# Patient Record
Sex: FEMALE | Race: WHITE | Employment: FULL TIME | URBAN - METROPOLITAN AREA
[De-identification: names, ages, dates, MRNs, and addresses within clinical notes are randomized per-mention and may not be internally consistent; named-entity substitution may affect disease eponyms.]

---

## 2018-11-12 ENCOUNTER — HOSPITAL ENCOUNTER (OUTPATIENT)
Dept: MAMMOGRAPHY | Age: 43
Discharge: HOME | End: 2018-11-12
Attending: FAMILY MEDICINE
Payer: COMMERCIAL

## 2018-11-12 DIAGNOSIS — Z12.31 VISIT FOR SCREENING MAMMOGRAM: ICD-10-CM

## 2018-11-12 PROCEDURE — 77067 SCR MAMMO BI INCL CAD: CPT

## 2020-01-10 ENCOUNTER — HOSPITAL ENCOUNTER (OUTPATIENT)
Dept: MAMMOGRAPHY | Age: 45
Discharge: HOME | End: 2020-01-10
Attending: FAMILY MEDICINE
Payer: COMMERCIAL

## 2020-01-10 DIAGNOSIS — Z12.31 VISIT FOR SCREENING MAMMOGRAM: ICD-10-CM

## 2020-01-10 PROCEDURE — 77067 SCR MAMMO BI INCL CAD: CPT

## 2021-01-25 ENCOUNTER — TRANSCRIBE ORDER (OUTPATIENT)
Dept: SCHEDULING | Age: 46
End: 2021-01-25

## 2021-01-25 DIAGNOSIS — Z12.31 ENCOUNTER FOR SCREENING MAMMOGRAM FOR MALIGNANT NEOPLASM OF BREAST: Primary | ICD-10-CM

## 2021-01-28 ENCOUNTER — HOSPITAL ENCOUNTER (OUTPATIENT)
Dept: MAMMOGRAPHY | Age: 46
Discharge: HOME | End: 2021-01-28
Attending: FAMILY MEDICINE
Payer: COMMERCIAL

## 2021-01-28 DIAGNOSIS — Z12.31 ENCOUNTER FOR SCREENING MAMMOGRAM FOR MALIGNANT NEOPLASM OF BREAST: ICD-10-CM

## 2021-01-28 PROCEDURE — 77063 BREAST TOMOSYNTHESIS BI: CPT

## 2022-01-17 ENCOUNTER — TRANSCRIBE ORDER (OUTPATIENT)
Dept: SCHEDULING | Age: 47
End: 2022-01-17

## 2022-01-17 DIAGNOSIS — Z12.31 ENCOUNTER FOR MAMMOGRAM TO ESTABLISH BASELINE MAMMOGRAM: Primary | ICD-10-CM

## 2022-02-02 ENCOUNTER — TRANSCRIBE ORDER (OUTPATIENT)
Dept: GENERAL RADIOLOGY | Age: 47
End: 2022-02-02

## 2022-02-02 ENCOUNTER — HOSPITAL ENCOUNTER (OUTPATIENT)
Dept: MAMMOGRAPHY | Age: 47
Discharge: HOME | End: 2022-02-02
Attending: FAMILY MEDICINE
Payer: COMMERCIAL

## 2022-02-02 DIAGNOSIS — Z12.31 VISIT FOR SCREENING MAMMOGRAM: Primary | ICD-10-CM

## 2022-02-02 DIAGNOSIS — R92.8 ABNORMAL MAMMOGRAM OF LEFT BREAST: Primary | ICD-10-CM

## 2022-02-02 DIAGNOSIS — Z12.31 VISIT FOR SCREENING MAMMOGRAM: ICD-10-CM

## 2022-02-02 PROCEDURE — 77063 BREAST TOMOSYNTHESIS BI: CPT

## 2022-02-11 ENCOUNTER — HOSPITAL ENCOUNTER (OUTPATIENT)
Dept: MAMMOGRAPHY | Age: 47
Discharge: HOME | End: 2022-02-11
Attending: FAMILY MEDICINE
Payer: COMMERCIAL

## 2022-02-11 DIAGNOSIS — R92.8 ABNORMAL MAMMOGRAM OF LEFT BREAST: ICD-10-CM

## 2022-02-11 PROCEDURE — 76642 ULTRASOUND BREAST LIMITED: CPT

## 2022-02-11 PROCEDURE — 77065 DX MAMMO INCL CAD UNI: CPT

## 2022-02-11 NOTE — PROGRESS NOTES
Called Dr. Carlos Eduardo Pritchard office and spoke with Cami Son. Advised her that a recommendation has been made for a left breast cyst aspiration or possible biopsy by the radiologist. Report and order request faxed to [phone]. [facility] that the patient has an appointment 2/15 at 0730.

## 2022-02-15 ENCOUNTER — HOSPITAL ENCOUNTER (OUTPATIENT)
Dept: MAMMOGRAPHY | Age: 47
Discharge: HOME | End: 2022-02-15
Attending: FAMILY MEDICINE

## 2022-02-15 DIAGNOSIS — R92.8 ABNORMAL MAMMOGRAM OF LEFT BREAST: ICD-10-CM

## 2023-01-24 ENCOUNTER — TRANSCRIBE ORDER (OUTPATIENT)
Dept: SCHEDULING | Age: 48
End: 2023-01-24

## 2023-01-24 DIAGNOSIS — Z12.31 SCREENING MAMMOGRAM FOR HIGH-RISK PATIENT: Primary | ICD-10-CM

## 2023-04-03 ENCOUNTER — HOSPITAL ENCOUNTER (OUTPATIENT)
Dept: MAMMOGRAPHY | Age: 48
End: 2023-04-03
Attending: FAMILY MEDICINE
Payer: COMMERCIAL

## 2023-04-03 DIAGNOSIS — Z12.31 SCREENING MAMMOGRAM FOR HIGH-RISK PATIENT: ICD-10-CM

## 2023-04-03 PROCEDURE — 77063 BREAST TOMOSYNTHESIS BI: CPT

## 2023-04-22 DIAGNOSIS — Z12.31 SCREENING MAMMOGRAM FOR HIGH-RISK PATIENT: Primary | ICD-10-CM

## 2024-11-01 ENCOUNTER — OFFICE VISIT (OUTPATIENT)
Facility: CLINIC | Age: 49
End: 2024-11-01

## 2024-11-01 VITALS
DIASTOLIC BLOOD PRESSURE: 85 MMHG | HEART RATE: 65 BPM | WEIGHT: 264 LBS | RESPIRATION RATE: 16 BRPM | SYSTOLIC BLOOD PRESSURE: 132 MMHG | HEIGHT: 63 IN | OXYGEN SATURATION: 94 % | BODY MASS INDEX: 46.78 KG/M2

## 2024-11-01 DIAGNOSIS — Z23 NEED FOR INFLUENZA VACCINATION: ICD-10-CM

## 2024-11-01 DIAGNOSIS — Z11.59 NEED FOR HEPATITIS C SCREENING TEST: ICD-10-CM

## 2024-11-01 DIAGNOSIS — Z11.4 ENCOUNTER FOR SCREENING FOR HIV: ICD-10-CM

## 2024-11-01 DIAGNOSIS — Z13.21 ENCOUNTER FOR VITAMIN DEFICIENCY SCREENING: ICD-10-CM

## 2024-11-01 DIAGNOSIS — E66.01 OBESITY, MORBID (MORE THAN 100 LBS OVER IDEAL WEIGHT OR BMI > 40): ICD-10-CM

## 2024-11-01 DIAGNOSIS — Z76.89 ENCOUNTER TO ESTABLISH CARE: Primary | ICD-10-CM

## 2024-11-01 PROBLEM — J30.9 ALLERGIC RHINITIS: Status: ACTIVE | Noted: 2021-12-17

## 2024-11-01 PROBLEM — F41.9 ANXIETY: Status: ACTIVE | Noted: 2021-12-17

## 2024-11-01 LAB
25(OH)D3 SERPL-MCNC: 27 NG/ML (ref 30–100)
ALBUMIN SERPL-MCNC: 4.1 G/DL (ref 3.5–5)
ALBUMIN/GLOB SERPL: 1.3 (ref 1.1–2.2)
ALP SERPL-CCNC: 93 U/L (ref 45–117)
ALT SERPL-CCNC: 23 U/L (ref 12–78)
ANION GAP SERPL CALC-SCNC: 5 MMOL/L (ref 2–12)
APPEARANCE UR: CLEAR
AST SERPL-CCNC: 12 U/L (ref 15–37)
BACTERIA URNS QL MICRO: NEGATIVE /HPF
BILIRUB SERPL-MCNC: 0.6 MG/DL (ref 0.2–1)
BILIRUB UR QL: NEGATIVE
BUN SERPL-MCNC: 13 MG/DL (ref 6–20)
BUN/CREAT SERPL: 21 (ref 12–20)
CALCIUM SERPL-MCNC: 8.9 MG/DL (ref 8.5–10.1)
CHLORIDE SERPL-SCNC: 108 MMOL/L (ref 97–108)
CHOLEST SERPL-MCNC: 197 MG/DL
CO2 SERPL-SCNC: 27 MMOL/L (ref 21–32)
COLOR UR: ABNORMAL
CREAT SERPL-MCNC: 0.63 MG/DL (ref 0.55–1.02)
EPITH CASTS URNS QL MICRO: ABNORMAL /LPF
ERYTHROCYTE [DISTWIDTH] IN BLOOD BY AUTOMATED COUNT: 12.4 % (ref 11.5–14.5)
EST. AVERAGE GLUCOSE BLD GHB EST-MCNC: 94 MG/DL
GLOBULIN SER CALC-MCNC: 3.1 G/DL (ref 2–4)
GLUCOSE SERPL-MCNC: 88 MG/DL (ref 65–100)
GLUCOSE UR STRIP.AUTO-MCNC: NEGATIVE MG/DL
HBA1C MFR BLD: 4.9 % (ref 4–5.6)
HCT VFR BLD AUTO: 41.4 % (ref 35–47)
HCV AB SER IA-ACNC: <0.02 INDEX
HCV AB SERPL QL IA: NONREACTIVE
HDLC SERPL-MCNC: 73 MG/DL
HDLC SERPL: 2.7 (ref 0–5)
HGB BLD-MCNC: 13.6 G/DL (ref 11.5–16)
HGB UR QL STRIP: NEGATIVE
HIV 1+2 AB+HIV1 P24 AG SERPL QL IA: NONREACTIVE
HIV 1/2 RESULT COMMENT: NORMAL
KETONES UR QL STRIP.AUTO: NEGATIVE MG/DL
LDLC SERPL CALC-MCNC: 114.4 MG/DL (ref 0–100)
LEUKOCYTE ESTERASE UR QL STRIP.AUTO: ABNORMAL
MCH RBC QN AUTO: 29.4 PG (ref 26–34)
MCHC RBC AUTO-ENTMCNC: 32.9 G/DL (ref 30–36.5)
MCV RBC AUTO: 89.6 FL (ref 80–99)
MUCOUS THREADS URNS QL MICRO: ABNORMAL /LPF
NITRITE UR QL STRIP.AUTO: NEGATIVE
NRBC # BLD: 0 K/UL (ref 0–0.01)
NRBC BLD-RTO: 0 PER 100 WBC
PH UR STRIP: 6.5 (ref 5–8)
PLATELET # BLD AUTO: 353 K/UL (ref 150–400)
PMV BLD AUTO: 9.7 FL (ref 8.9–12.9)
POTASSIUM SERPL-SCNC: 4.1 MMOL/L (ref 3.5–5.1)
PROT SERPL-MCNC: 7.2 G/DL (ref 6.4–8.2)
PROT UR STRIP-MCNC: NEGATIVE MG/DL
RBC # BLD AUTO: 4.62 M/UL (ref 3.8–5.2)
RBC #/AREA URNS HPF: ABNORMAL /HPF (ref 0–5)
SODIUM SERPL-SCNC: 140 MMOL/L (ref 136–145)
SP GR UR REFRACTOMETRY: 1.02 (ref 1–1.03)
TRIGL SERPL-MCNC: 48 MG/DL
UROBILINOGEN UR QL STRIP.AUTO: 0.2 EU/DL (ref 0.2–1)
VLDLC SERPL CALC-MCNC: 9.6 MG/DL
WBC # BLD AUTO: 5 K/UL (ref 3.6–11)
WBC URNS QL MICRO: ABNORMAL /HPF (ref 0–4)

## 2024-11-01 RX ORDER — CYCLOBENZAPRINE HCL 5 MG
1 TABLET ORAL 3 TIMES DAILY PRN
COMMUNITY

## 2024-11-01 SDOH — ECONOMIC STABILITY: FOOD INSECURITY: WITHIN THE PAST 12 MONTHS, THE FOOD YOU BOUGHT JUST DIDN'T LAST AND YOU DIDN'T HAVE MONEY TO GET MORE.: NEVER TRUE

## 2024-11-01 SDOH — ECONOMIC STABILITY: FOOD INSECURITY: WITHIN THE PAST 12 MONTHS, YOU WORRIED THAT YOUR FOOD WOULD RUN OUT BEFORE YOU GOT MONEY TO BUY MORE.: NEVER TRUE

## 2024-11-01 SDOH — ECONOMIC STABILITY: INCOME INSECURITY: HOW HARD IS IT FOR YOU TO PAY FOR THE VERY BASICS LIKE FOOD, HOUSING, MEDICAL CARE, AND HEATING?: NOT HARD AT ALL

## 2024-11-01 ASSESSMENT — PATIENT HEALTH QUESTIONNAIRE - PHQ9
SUM OF ALL RESPONSES TO PHQ QUESTIONS 1-9: 0
SUM OF ALL RESPONSES TO PHQ9 QUESTIONS 1 & 2: 0
SUM OF ALL RESPONSES TO PHQ QUESTIONS 1-9: 0
1. LITTLE INTEREST OR PLEASURE IN DOING THINGS: NOT AT ALL
SUM OF ALL RESPONSES TO PHQ QUESTIONS 1-9: 0
2. FEELING DOWN, DEPRESSED OR HOPELESS: NOT AT ALL
SUM OF ALL RESPONSES TO PHQ QUESTIONS 1-9: 0

## 2024-11-01 NOTE — PROGRESS NOTES
Chief Complaint   Patient presents with    Establish Care     1. Have you been to the ER, urgent care clinic since your last visit?  Hospitalized since your last visit?No    2. Have you seen or consulted any other health care providers outside of the [facility] since your last visit?  Include any pap smears or colon screening. No

## 2024-11-01 NOTE — PATIENT INSTRUCTIONS
WEIGHT LOSS PLAN    1.  Read food labels and count calories.  Goal 9504-8860 calories daily for women and 6256-8144 calories daily for men as you are weaning off sugar and preparing to start a Low Calorie Dietary Approach.  Achieve this by decreasing caloric intake by 500 calories by weekly increments.  NOTE:  1 pound of fat = 3500 kilocalories!    Www.loseit.[employer]  Www.[place].[employer]  Www.[employer] intelligence  Www.[employer].gov  Weight watchers mobile    Calories needed to lose 1-2 pounds a week = 10 x your weight in pounds    2.  Increase water intake.    Per [employer] Weight loss Program, it is important to drink 1/2 your body weight in ounces of water daily.  Decrease your water consumption 2-3 hours before bedtime to prevent sleep disturbance from frequent urination.    3.  Decrease sugary beverages.  Each can or glass of soda increases your risk of obesity by 60%.  You can lose 10 pounds in a month by avoiding any soda.     12 oz can of soda = 140 calories, 16 oz cup of sweet tea = 200 calories    16 oz orange juice = 200 calories, 10 oz apple juice = 150 calories   32 oz sports drink = 200 calories, 16 oz punch = 240 calories   3.5 oz alcohol = 100-150 calories     4.  Avoid High Fructose Corn Syrup products.  This ingredient makes products highly addictive!    5.  Exercise 30 minutes daily 5 days weekly, minimally.  If you burn 3500 calories that equals a pound!  Try using a pedometer  or a smart watch to your count steps. Visit www.[employer].Logicalware for a free pedometer and diet recommendations.      Your maximum heart rate = 220 - your age    Never exercise at your maximum heart rate.    See handout for target heart rate.    6.  Decrease carbohydrates (white bread, pasta, rice, potatoes, sweet foods and sweet drinks like soda, tea, coffee, juice and sports drinks).  Increase fiber and protein.    Goal:   calories daily of carbohydrates     Try CHROMIUM PICONATE 200 MG THREE TIMES DAILY,    to

## 2024-11-01 NOTE — PROGRESS NOTES
Carmen Mott is a 48 y.o. female , new patient, here for evaluation of the following chief complaint(s): Establish Care     Subjective:  History of Present Illness  The patient is a 48-year-old female who presents for evaluation of multiple medical concerns.    She reports experiencing high blood pressure, which she attributes to a stressful month. She has a blood pressure cuff at home for monitoring. She has not seen her primary care physician since 01/2023.    She mentions an accident at work on 10/10/2024, resulting in a minor concussion from which she is currently recovering. Additionally, her mother had a fall last week, necessitating an emergency room visit.    She uses a CPAP machine and takes Flexeril as needed. She applies nystatin cream for occasional breakouts under breast.     She has been struggling with weight loss since menopause. She is interested in obtaining a Fitbit through her HSA and is seeking a referral or letter from her doctor to facilitate this. She has joined a health club and is working with a . She admits to skipping meals when busy and has a preference for sweets. She has set a weight goal of 185 pounds. She previously lost 50 to 60 pounds by attending the gym five times a week. She works four days a week, eight hours a day. She is making an effort to eat breakfast and maintain a balanced diet throughout the day. She drinks at least 64 ounces of water daily and has tried protein shakes in the past. She is curious about Ozempic and Wegovy but has not tried them. She reports no history of irritable bowel disease, gallbladder issues, or stomach problems.    She had a mammogram a month ago and it was normal. Her Pap smear was also normal.    FAMILY HISTORY  She denies any family history of thyroid medullary cancer.    Reviewed PmHx, RxHx, FmHx, SocHx, AllgHx and updated in chart.     Review of Systems  Constitutional: negative for fevers, chills, anorexia and weight

## 2024-11-03 LAB — TSH SERPL DL<=0.05 MIU/L-ACNC: 1.74 UIU/ML (ref 0.45–4.5)

## 2024-11-21 ENCOUNTER — TELEMEDICINE (OUTPATIENT)
Facility: CLINIC | Age: 49
End: 2024-11-21

## 2024-11-21 DIAGNOSIS — R82.71 BACTERIURIA, ASYMPTOMATIC: ICD-10-CM

## 2024-11-21 DIAGNOSIS — R63.2 POLYPHAGIA: ICD-10-CM

## 2024-11-21 DIAGNOSIS — E78.00 HYPERCHOLESTEROLEMIA: ICD-10-CM

## 2024-11-21 DIAGNOSIS — Z71.2 ENCOUNTER TO DISCUSS TEST RESULTS: Primary | ICD-10-CM

## 2024-11-21 DIAGNOSIS — E66.01 OBESITY, MORBID (MORE THAN 100 LBS OVER IDEAL WEIGHT OR BMI > 40): ICD-10-CM

## 2024-11-21 ASSESSMENT — PATIENT HEALTH QUESTIONNAIRE - PHQ9
SUM OF ALL RESPONSES TO PHQ9 QUESTIONS 1 & 2: 0
SUM OF ALL RESPONSES TO PHQ QUESTIONS 1-9: 0
SUM OF ALL RESPONSES TO PHQ QUESTIONS 1-9: 0
1. LITTLE INTEREST OR PLEASURE IN DOING THINGS: NOT AT ALL
SUM OF ALL RESPONSES TO PHQ QUESTIONS 1-9: 0
2. FEELING DOWN, DEPRESSED OR HOPELESS: NOT AT ALL
SUM OF ALL RESPONSES TO PHQ QUESTIONS 1-9: 0

## 2024-11-21 NOTE — PROGRESS NOTES
Chief Complaint   Patient presents with    Discuss Labs     1. Have you been to the ER, urgent care clinic since your last visit?  Hospitalized since your last visit?No    2. Have you seen or consulted any other health care providers outside of the [facility] since your last visit?  Include any pap smears or colon screening. No    
lbs over ideal weight or BMI > 40)  -     She expressed interest in weight loss medications, but her insurance does not cover them. She was advised to try Contrave, starting with one tablet daily for a week, then increasing to twice daily. She was also advised to monitor her carbohydrate intake using the Lose It rashaun. An EKG will be performed at her next visit in a month to assess her suitability for phentermine.  - naltrexone-buPROPion (CONTRAVE) 8-90 MG per extended release tablet; Take 1 tablet by mouth 2 times daily Take 1 tab by mouth daily for a week, then increase to twice a day if tolerated., Disp-180 tablet, R-0Please use coupon, thank you!Normal  3. Polyphagia  -     naltrexone-buPROPion (CONTRAVE) 8-90 MG per extended release tablet; Take 1 tablet by mouth 2 times daily Take 1 tab by mouth daily for a week, then increase to twice a day if tolerated., Disp-180 tablet, R-0Please use coupon, thank you!Normal  4. Hypercholesterolemia  -     naltrexone-buPROPion (CONTRAVE) 8-90 MG per extended release tablet; Take 1 tablet by mouth 2 times daily Take 1 tab by mouth daily for a week, then increase to twice a day if tolerated., Disp-180 tablet, R-0Please use coupon, thank you!Normal  5. Bacteriuria, asymptomatic       - The presence of bacteria in her urine suggests a possible infection. She was advised to increase her water intake to at least 64 ounces per day and consider cranberry juice or pills. If she develops symptoms of a urinary tract infection, she should contact the office immediately for antibiotics.    Patient Instructions   WEIGHT LOSS PLAN    1.  Read food labels and count calories.  Goal 1585-0089 calories daily for women and 0637-0716 calories daily for men as you are weaning off sugar and preparing to start a Low Calorie Dietary Approach.  Achieve this by decreasing caloric intake by 500 calories by weekly increments.  NOTE:  1 pound of fat = 3500

## 2024-11-21 NOTE — PATIENT INSTRUCTIONS
decrease Premenstrual carbohydrate cravings.    7.  Eat 3-5 small meals daily, include lots of protein (beans/legumes, nuts, lean meat, eggs) and green vegetables with each.  (Breakfast, lunch, dinner with 2 healthy snacks)    8.  Get proper rest 7-8 hours uninterrupted.  When you get less than 6 hours, it triggers hunger by affecting your [place]hlin:Leptin ratio and this results in weight gain.    9.  Watch your food portions.  Green leafy vegetables should cover 1/2 of the plate, lean meat 1/4 of the plate, starchy vegetable 1/4 of the plate.  Use smaller plates.    10.  Do not eat until you are full.  Eat until you are no longer hungry.  If you are not sure, try drinking a glass of water before getting your second serving of food.    11.  Do not weigh yourself daily.  Wait until your next office visit.  Use how you feel and  how your clothes fit as measurements of success.    12.  Address your spirituality to draw strength from above during your journey.  Remember \"I am fearfully and wonderfully made.  Marvelous in His eyes.\"    13.  Set realistic, appropriate and achievable weight loss goals:     RECOMMENDED TARGET WEIGHT LOSS:  Initial weight loss of 5-10% of your initial body weight achieved over 6 months or a decrease of 2 BMI units.     MINIMUM GOAL OF WEIGHT LOSS:  Reduce body weight and maintain a lower body weight.  Prevent weight gain.    RELATED WEBSITES:      Www.obesityaction.org  Consider joining the [employer] (OAC) for $25/year.  The OAC mission is to elevated and empower those affected by obesity through education, advocacy and support.  Quarterly journals included in membership fee.    Www.[email or web address]  www.[employer]ustedPlaces.com     RELATED DIETS:  Dr. Rosalino Man Million Pound Weight loss challenge, Mediterranean diet, [place] Diet, Weight Watchers, Paleo Diet (anti-inflammatory diet)